# Patient Record
Sex: FEMALE | Race: WHITE
[De-identification: names, ages, dates, MRNs, and addresses within clinical notes are randomized per-mention and may not be internally consistent; named-entity substitution may affect disease eponyms.]

---
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
## 2019-11-13 ENCOUNTER — HOSPITAL ENCOUNTER (EMERGENCY)
Dept: HOSPITAL 25 - UCCORT | Age: 19
Discharge: HOME | End: 2019-11-13
Payer: COMMERCIAL

## 2019-11-13 VITALS — DIASTOLIC BLOOD PRESSURE: 73 MMHG | SYSTOLIC BLOOD PRESSURE: 139 MMHG

## 2019-11-13 DIAGNOSIS — J02.0: Primary | ICD-10-CM

## 2019-11-13 DIAGNOSIS — I10: ICD-10-CM

## 2019-11-13 PROCEDURE — 99212 OFFICE O/P EST SF 10 MIN: CPT

## 2019-11-13 PROCEDURE — G0463 HOSPITAL OUTPT CLINIC VISIT: HCPCS

## 2019-11-13 PROCEDURE — 87651 STREP A DNA AMP PROBE: CPT

## 2019-11-13 NOTE — ED
Throat Pain/Nasal Congestion





- HPI Summary


HPI Summary: 





19 yr old female with the complaint of sore throat.  Onset of symptoms over the 

past couple of days.  No NV.  No runny nose. She has some pain in each ear.  

Symptoms worse with swallowing.  No other complaints. 





- History of Current Complaint


Chief Complaint: UCGeneralIllness


Time Seen by Provider: 11/13/19 07:51





- Allergies/Home Medications


Allergies/Adverse Reactions: 


 Allergies











Allergy/AdvReac Type Severity Reaction Status Date / Time


 


No Known Allergies Allergy   Verified 11/13/19 07:51











Home Medications: 


 Home Medications





Acetaminophen ADULT LIQ* [Tylenol ADULT LIQ*] 650 mg PO Q4H PRN 11/13/19 [

History Confirmed 11/13/19]


Etonogest/Eth.estradiol (Nf) [Nuvaring Vaginal Ring] 1 each VAGINAL .SEE 

COMMENTS 11/13/19 [History Confirmed 11/13/19]


Ibuprofen TAB* [Advil TAB*] 845 mg PO Q6H PRN 11/13/19 [History Confirmed 11/13/ 19]











PMH/Surg Hx/FS Hx/Imm Hx





- Surgical History


Surgery Procedure, Year, and Place: REMOVAL OF INFECTED LYMPH NODES AS A CHILD


Infectious Disease History: No


Infectious Disease History: 


   Denies: Traveled Outside the [place] in Last 30 Days





- Family History


Known Family History: Positive: None





- Social History


Occupation: Student


Alcohol Use: Occasionally


Substance Use Type: Reports: None


Smoking Status (MU): Never Smoked Tobacco





Review of Systems


Positive: Sore Throat


All Other Systems Reviewed And Are Negative: Yes





Physical Exam


Triage Information Reviewed: Yes


Vital Signs On Initial Exam: 


 Initial Vitals











Temp Pulse Resp BP Pulse Ox


 


 98.8 F   96   16   139/73   100 


 


 11/13/19 07:49  11/13/19 07:49  11/13/19 07:49  11/13/19 07:49  11/13/19 07:49











Vital Signs Reviewed: Yes


Appearance: Positive: Well-Appearing, No Pain Distress


Skin: Positive: Warm, Skin Color Reflects Adequate Perfusion


Head/Face: Positive: Normal Head/Face Inspection


Eyes: Positive: EOMI


ENT: Positive: Pharyngeal erythema


Neck: Positive: Nontender


Respiratory/Lung Sounds: Positive: Clear to Auscultation, Breath Sounds Present


Cardiovascular: Positive: RRR.  Negative: Murmur


Abdomen Description: Negative: Distended


Musculoskeletal: Positive: Strength/ROM Intact


Neurological: Positive: Sensory/Motor Intact, Alert, Oriented to Person Place, 

Time, CN Intact II-III


Psychiatric: Positive: Normal





Diagnostics





- Vital Signs


 Vital Signs











  Temp Pulse Resp BP Pulse Ox


 


 11/13/19 07:49  98.8 F  96  16  139/73  100














- Laboratory


Lab Results: 


 Lab Results











  11/13/19 Range/Units





  07:59 


 


Group A Strep Rapid  Positive A  (Negative)  











Lab Statement: Any lab studies that have been ordered have been reviewed, and 

results considered in the medical decision making process.





EENT Course/Dx





- Course


Course Of Treatment: 19 yr old with strep pharyngitis. Rx with amox.





- Diagnoses


Provider Diagnoses: 


 Strep pharyngitis, Hypertension








Discharge ED





- Sign-Out/Discharge


Documenting (check all that apply): Patient Departure


All imaging exams completed and their final reports reviewed: No Studies





- Discharge Plan


Condition: Good


Disposition: HOME


Prescriptions: 


Amoxicillin PO (*) [Amoxicillin 500 MG CAP*] 500 mg PO TID #30 cap


Patient Education Materials:  Strep Throat (DC)


Referrals: 


[facility]IAN REFERRAL [Outside]


No Primary Care Phys,NOPCP [Primary Care Provider] - 





- Billing Disposition and Condition


Condition: GOOD


Disposition: Home